# Patient Record
Sex: MALE | Race: WHITE | NOT HISPANIC OR LATINO | Employment: OTHER | URBAN - NONMETROPOLITAN AREA
[De-identification: names, ages, dates, MRNs, and addresses within clinical notes are randomized per-mention and may not be internally consistent; named-entity substitution may affect disease eponyms.]

---

## 2022-01-01 ENCOUNTER — OFFICE VISIT (OUTPATIENT)
Dept: URGENT CARE | Facility: PHYSICIAN GROUP | Age: 82
End: 2022-01-01
Payer: COMMERCIAL

## 2022-01-01 VITALS
RESPIRATION RATE: 16 BRPM | DIASTOLIC BLOOD PRESSURE: 66 MMHG | TEMPERATURE: 98.8 F | SYSTOLIC BLOOD PRESSURE: 132 MMHG | HEART RATE: 77 BPM | OXYGEN SATURATION: 96 %

## 2022-01-01 DIAGNOSIS — L03.115 CELLULITIS OF RIGHT LOWER EXTREMITY: ICD-10-CM

## 2022-01-01 PROCEDURE — 99203 OFFICE O/P NEW LOW 30 MIN: CPT | Performed by: FAMILY MEDICINE

## 2022-01-01 RX ORDER — TRAMADOL HYDROCHLORIDE 50 MG/1
50 TABLET ORAL EVERY 4 HOURS PRN
COMMUNITY

## 2022-01-01 RX ORDER — ACETAMINOPHEN 500 MG
500-1000 TABLET ORAL EVERY 6 HOURS PRN
COMMUNITY

## 2022-01-01 RX ORDER — ANORECTAL OINTMENT 15.7; .44; 24; 20.6 G/100G; G/100G; G/100G; G/100G
OINTMENT TOPICAL PRN
COMMUNITY

## 2022-01-01 RX ORDER — SALICYLIC ACID
5 POWDER (GRAM) MISCELLANEOUS
COMMUNITY

## 2022-01-01 RX ORDER — SULFAMETHOXAZOLE AND TRIMETHOPRIM 800; 160 MG/1; MG/1
1 TABLET ORAL 2 TIMES DAILY
Qty: 10 TABLET | Refills: 0 | Status: SHIPPED | OUTPATIENT
Start: 2022-01-01 | End: 2022-01-01

## 2022-01-01 RX ORDER — ALBUTEROL SULFATE 90 UG/1
AEROSOL, METERED RESPIRATORY (INHALATION) EVERY 6 HOURS PRN
COMMUNITY

## 2022-01-01 RX ORDER — CEPHALEXIN 500 MG/1
500 CAPSULE ORAL 4 TIMES DAILY
Qty: 20 CAPSULE | Refills: 0 | Status: SHIPPED | OUTPATIENT
Start: 2022-01-01 | End: 2022-01-01

## 2022-01-01 ASSESSMENT — ENCOUNTER SYMPTOMS: FEVER: 0

## 2022-07-20 NOTE — PROGRESS NOTES
Subjective:     Elmer West is a 81 y.o. male who presents for Blister (On the R foot )    HPI  Pt presents for evaluation of an acute problem  Pt with right 2nd toe blister   Has had increased redness the past few days   Trying to soak it without much improvement   Redness is spreading   Area is very painful   No active drainage or bleeding     Review of Systems   Constitutional: Negative for fever.   Skin: Positive for rash.       PMH:  has a past medical history of Arthritis, Backpain, BPH (benign prostatic hyperplasia), CATARACT, Indigestion, Pneumonia (1944), Stroke (HCC) (1996), and Unspecified hemorrhagic conditions.    He has no past medical history of Angina, Arrhythmia, ASTHMA, Bronchitis, Cancer ([facility]), Congestive heart failure (HCC), COPD, Diabetes, Dialysis, Glaucoma, Heart murmur, Heart valve disease, Hypertension, Infectious disease, Jaundice, Myocardial infarct ([facility]), Pacemaker, Personal history of venous thrombosis and embolism, Psychiatric problem, Renal disorder, Rheumatic fever, Seizure ([facility]), Unspecified disorder of thyroid, or Unspecified urinary incontinence.  MEDS:   Current Outpatient Medications:   •  acetaminophen (TYLENOL) 500 MG Tab, Take 500-1,000 mg by mouth every 6 hours as needed., Disp: , Rfl:   •  NS SOLN 60 mL with albuterol 2.5 mg/0.5 mL NEBU 100 mg, Take  by nebulization., Disp: , Rfl:   •  albuterol 108 (90 Base) MCG/ACT Aero Soln inhalation aerosol, Inhale every 6 hours as needed for Shortness of Breath., Disp: , Rfl:   •  Incontinence Supply Disposable (TRANQUILITY ATN BRIEF LARGE) Misc, , Disp: , Rfl:   •  menthol-zinc oxide (CALMOSEPTINE) 0.44-20.6 % Ointment ointment, Apply  topically as needed., Disp: , Rfl:   •  carbamide peroxide (DEBROX) 6.5 % Solution, Administer 6 Drops into affected ear(s) 2 times a day., Disp: , Rfl:   •  holli oil (CASTOR OIL) Oil, Take 5 mL by mouth one time as needed., Disp: , Rfl:   •  Clotrimazole 1 % Ointment, Apply  topically., Disp: , Rfl:    •  diclofenac sodium (VOLTAREN) 1 % Gel, Apply  topically 4 times a day as needed., Disp: , Rfl:   •  Melatonin 3 MG Cap, Take  by mouth., Disp: , Rfl:   •  Tiotropium Bromide-Olodaterol 2.5-2.5 MCG/ACT Aero Soln, Inhale., Disp: , Rfl:   •  sertraline (ZOLOFT) 50 MG Tab, Take 50 mg by mouth every day., Disp: , Rfl:   •  traMADol (ULTRAM) 50 MG Tab, Take 50 mg by mouth every four hours as needed., Disp: , Rfl:   •  docusate sodium (COLACE) 100 MG CAPS, Take 1 Cap by mouth 2 Times a Day., Disp: , Rfl:   ALLERGIES: No Known Allergies  SURGHX:   Past Surgical History:   Procedure Laterality Date   • ORIF, FRACTURE, ACETABULUM  7/27/2012    Performed by YAS CORTES at SURGERY [facility]   • PIN INSERTION  7/25/2012    Performed by DARLENE HAMM at SURGERY [facility]   • CLOSED REDUCTION  7/25/2012    Performed by DARLENE HAMM at SURGERY [facility]   • OTHER ABDOMINAL SURGERY      umbilical hernia repair     SOCHX:  reports that he has quit smoking. He does not have any smokeless tobacco history on file. He reports current alcohol use. He reports that he does not use drugs.     Objective:   /66   Pulse 77   Temp 37.1 °C (98.8 °F) (Temporal)   Resp 16   SpO2 96%     Physical Exam  Constitutional:       General: He is not in acute distress.     Appearance: He is well-developed. He is not diaphoretic.   Pulmonary:      Effort: Pulmonary effort is normal.   Neurological:      Mental Status: He is alert.     Right second toe with medial full-thickness skin blister with no active drainage/bleeding.  Moderate erythema and swelling of the second toe.  Erythema extends to the base of the toe and does not extend further into the foot.    Assessment/Plan:   Assessment    1. Cellulitis of right lower extremity  - cephALEXin (KEFLEX) 500 MG Cap; Take 1 Capsule by mouth 4 times a day for 5 days.  Dispense: 20 Capsule; Refill: 0  - sulfamethoxazole-trimethoprim (BACTRIM DS) 800-160 MG tablet;  Take 1 Tablet by mouth 2 times a day for 5 days.  Dispense: 10 Tablet; Refill: 0  - cefTRIAXone (ROCEPHIN) 500 mg, lidocaine (XYLOCAINE) 1 % 1.8 mL for IM use    Patient with cellulitis of right lower extremity.  Injection of ceftriaxone given in office and patient started on combination Bactrim and Keflex.  He follows with a podiatrist for other foot issues and was instructed to follow-up with his podiatrist as soon as he is able.  If he is unable to see his podiatrist by the end of the week, he will follow-up in this office prior to the weekend.  Given close follow-up/ER precautions.  Follow-up with podiatry.